# Patient Record
Sex: FEMALE | Race: WHITE | NOT HISPANIC OR LATINO
[De-identification: names, ages, dates, MRNs, and addresses within clinical notes are randomized per-mention and may not be internally consistent; named-entity substitution may affect disease eponyms.]

---

## 2020-12-16 ENCOUNTER — TRANSCRIPTION ENCOUNTER (OUTPATIENT)
Age: 55
End: 2020-12-16

## 2022-08-10 PROBLEM — Z00.00 ENCOUNTER FOR PREVENTIVE HEALTH EXAMINATION: Status: ACTIVE | Noted: 2022-08-10

## 2022-08-13 ENCOUNTER — EMERGENCY (EMERGENCY)
Facility: HOSPITAL | Age: 57
LOS: 0 days | Discharge: HOME | End: 2022-08-13
Attending: EMERGENCY MEDICINE
Payer: COMMERCIAL

## 2022-08-13 VITALS
OXYGEN SATURATION: 98 % | DIASTOLIC BLOOD PRESSURE: 68 MMHG | RESPIRATION RATE: 17 BRPM | HEART RATE: 75 BPM | SYSTOLIC BLOOD PRESSURE: 115 MMHG | TEMPERATURE: 98 F

## 2022-08-13 VITALS
HEART RATE: 60 BPM | SYSTOLIC BLOOD PRESSURE: 112 MMHG | RESPIRATION RATE: 16 BRPM | TEMPERATURE: 98 F | HEIGHT: 60 IN | OXYGEN SATURATION: 98 % | WEIGHT: 119.93 LBS | DIASTOLIC BLOOD PRESSURE: 80 MMHG

## 2022-08-13 DIAGNOSIS — Z20.822 CONTACT WITH AND (SUSPECTED) EXPOSURE TO COVID-19: ICD-10-CM

## 2022-08-13 DIAGNOSIS — R11.0 NAUSEA: ICD-10-CM

## 2022-08-13 DIAGNOSIS — R55 SYNCOPE AND COLLAPSE: ICD-10-CM

## 2022-08-13 DIAGNOSIS — R53.83 OTHER FATIGUE: ICD-10-CM

## 2022-08-13 DIAGNOSIS — M79.10 MYALGIA, UNSPECIFIED SITE: ICD-10-CM

## 2022-08-13 DIAGNOSIS — E78.5 HYPERLIPIDEMIA, UNSPECIFIED: ICD-10-CM

## 2022-08-13 DIAGNOSIS — R42 DIZZINESS AND GIDDINESS: ICD-10-CM

## 2022-08-13 LAB
ALBUMIN SERPL ELPH-MCNC: 3.6 G/DL — SIGNIFICANT CHANGE UP (ref 3.3–5)
ALP SERPL-CCNC: 91 U/L — SIGNIFICANT CHANGE UP (ref 40–120)
ALT FLD-CCNC: 27 U/L — SIGNIFICANT CHANGE UP (ref 12–78)
ANION GAP SERPL CALC-SCNC: 4 MMOL/L — LOW (ref 5–17)
APTT BLD: 26.7 SEC — LOW (ref 27.5–35.5)
AST SERPL-CCNC: 18 U/L — SIGNIFICANT CHANGE UP (ref 15–37)
BASOPHILS # BLD AUTO: 0.02 K/UL — SIGNIFICANT CHANGE UP (ref 0–0.2)
BASOPHILS NFR BLD AUTO: 0.4 % — SIGNIFICANT CHANGE UP (ref 0–2)
BILIRUB SERPL-MCNC: 0.4 MG/DL — SIGNIFICANT CHANGE UP (ref 0.2–1.2)
BUN SERPL-MCNC: 14 MG/DL — SIGNIFICANT CHANGE UP (ref 7–23)
CALCIUM SERPL-MCNC: 9 MG/DL — SIGNIFICANT CHANGE UP (ref 8.5–10.1)
CHLORIDE SERPL-SCNC: 112 MMOL/L — HIGH (ref 96–108)
CO2 SERPL-SCNC: 26 MMOL/L — SIGNIFICANT CHANGE UP (ref 22–31)
CREAT SERPL-MCNC: 0.71 MG/DL — SIGNIFICANT CHANGE UP (ref 0.5–1.3)
D DIMER BLD IA.RAPID-MCNC: <150 NG/ML DDU — SIGNIFICANT CHANGE UP
EGFR: 99 ML/MIN/1.73M2 — SIGNIFICANT CHANGE UP
EOSINOPHIL # BLD AUTO: 0.05 K/UL — SIGNIFICANT CHANGE UP (ref 0–0.5)
EOSINOPHIL NFR BLD AUTO: 0.9 % — SIGNIFICANT CHANGE UP (ref 0–6)
GLUCOSE SERPL-MCNC: 101 MG/DL — HIGH (ref 70–99)
HCG SERPL-ACNC: 3 MIU/ML — SIGNIFICANT CHANGE UP
HCT VFR BLD CALC: 36.6 % — SIGNIFICANT CHANGE UP (ref 34.5–45)
HGB BLD-MCNC: 12.9 G/DL — SIGNIFICANT CHANGE UP (ref 11.5–15.5)
IMM GRANULOCYTES NFR BLD AUTO: 0.2 % — SIGNIFICANT CHANGE UP (ref 0–1.5)
INR BLD: 1.02 RATIO — SIGNIFICANT CHANGE UP (ref 0.88–1.16)
LYMPHOCYTES # BLD AUTO: 1.42 K/UL — SIGNIFICANT CHANGE UP (ref 1–3.3)
LYMPHOCYTES # BLD AUTO: 25.1 % — SIGNIFICANT CHANGE UP (ref 13–44)
MCHC RBC-ENTMCNC: 32.3 PG — SIGNIFICANT CHANGE UP (ref 27–34)
MCHC RBC-ENTMCNC: 35.2 GM/DL — SIGNIFICANT CHANGE UP (ref 32–36)
MCV RBC AUTO: 91.5 FL — SIGNIFICANT CHANGE UP (ref 80–100)
MONOCYTES # BLD AUTO: 0.49 K/UL — SIGNIFICANT CHANGE UP (ref 0–0.9)
MONOCYTES NFR BLD AUTO: 8.7 % — SIGNIFICANT CHANGE UP (ref 2–14)
NEUTROPHILS # BLD AUTO: 3.66 K/UL — SIGNIFICANT CHANGE UP (ref 1.8–7.4)
NEUTROPHILS NFR BLD AUTO: 64.7 % — SIGNIFICANT CHANGE UP (ref 43–77)
PLATELET # BLD AUTO: 192 K/UL — SIGNIFICANT CHANGE UP (ref 150–400)
POTASSIUM SERPL-MCNC: 3.5 MMOL/L — SIGNIFICANT CHANGE UP (ref 3.5–5.3)
POTASSIUM SERPL-SCNC: 3.5 MMOL/L — SIGNIFICANT CHANGE UP (ref 3.5–5.3)
PROT SERPL-MCNC: 6.7 GM/DL — SIGNIFICANT CHANGE UP (ref 6–8.3)
PROTHROM AB SERPL-ACNC: 11.8 SEC — SIGNIFICANT CHANGE UP (ref 10.5–13.4)
RBC # BLD: 4 M/UL — SIGNIFICANT CHANGE UP (ref 3.8–5.2)
RBC # FLD: 12 % — SIGNIFICANT CHANGE UP (ref 10.3–14.5)
SODIUM SERPL-SCNC: 142 MMOL/L — SIGNIFICANT CHANGE UP (ref 135–145)
TROPONIN I, HIGH SENSITIVITY RESULT: <3 NG/L — SIGNIFICANT CHANGE UP
WBC # BLD: 5.65 K/UL — SIGNIFICANT CHANGE UP (ref 3.8–10.5)
WBC # FLD AUTO: 5.65 K/UL — SIGNIFICANT CHANGE UP (ref 3.8–10.5)

## 2022-08-13 PROCEDURE — 85610 PROTHROMBIN TIME: CPT

## 2022-08-13 PROCEDURE — 99285 EMERGENCY DEPT VISIT HI MDM: CPT

## 2022-08-13 PROCEDURE — 99285 EMERGENCY DEPT VISIT HI MDM: CPT | Mod: 25

## 2022-08-13 PROCEDURE — 84702 CHORIONIC GONADOTROPIN TEST: CPT

## 2022-08-13 PROCEDURE — 85379 FIBRIN DEGRADATION QUANT: CPT

## 2022-08-13 PROCEDURE — 93010 ELECTROCARDIOGRAM REPORT: CPT

## 2022-08-13 PROCEDURE — 80053 COMPREHEN METABOLIC PANEL: CPT

## 2022-08-13 PROCEDURE — 71045 X-RAY EXAM CHEST 1 VIEW: CPT | Mod: 26

## 2022-08-13 PROCEDURE — 84484 ASSAY OF TROPONIN QUANT: CPT

## 2022-08-13 PROCEDURE — 85730 THROMBOPLASTIN TIME PARTIAL: CPT

## 2022-08-13 PROCEDURE — U0005: CPT

## 2022-08-13 PROCEDURE — U0003: CPT

## 2022-08-13 PROCEDURE — 85025 COMPLETE CBC W/AUTO DIFF WBC: CPT

## 2022-08-13 PROCEDURE — 36415 COLL VENOUS BLD VENIPUNCTURE: CPT

## 2022-08-13 PROCEDURE — 93005 ELECTROCARDIOGRAM TRACING: CPT

## 2022-08-13 PROCEDURE — 71045 X-RAY EXAM CHEST 1 VIEW: CPT

## 2022-08-13 RX ORDER — SODIUM CHLORIDE 9 MG/ML
1000 INJECTION INTRAMUSCULAR; INTRAVENOUS; SUBCUTANEOUS ONCE
Refills: 0 | Status: COMPLETED | OUTPATIENT
Start: 2022-08-13 | End: 2022-08-13

## 2022-08-13 RX ADMIN — SODIUM CHLORIDE 1000 MILLILITER(S): 9 INJECTION INTRAMUSCULAR; INTRAVENOUS; SUBCUTANEOUS at 21:17

## 2022-08-13 NOTE — ED PROVIDER NOTE - NS ED ROS FT
Constitutional: No fever or chills  Eyes: No visual changes  HEENT: No throat pain  CV: No chest pain  Resp: No SOB no cough  GI: No abd pain, nausea or vomiting  : No dysuria  MSK: No musculoskeletal pain  Skin: No rash  Neuro: No headache Constitutional: + fatigue, No fever or chills  Eyes: No visual changes  HEENT: No throat pain  CV: No chest pain  Resp: No SOB no cough  GI: + nausea No abd pain, or vomiting  : No dysuria  MSK: No musculoskeletal pain  Skin: No rash  Neuro: + lightheadedness, No headache

## 2022-08-13 NOTE — ED PROVIDER NOTE - PROGRESS NOTE DETAILS
pt remains well. work up without acute findings. dc home. f/u with her cardiologist dr weaver on Monday. MD ARIAN

## 2022-08-13 NOTE — ED PROVIDER NOTE - OBJECTIVE STATEMENT
56 y/o female with a PMHx of HLD, Appendix surgery 4-5 years ago, 2 c-sections, elbow surgery presents to the ED s/p syncope. States she was at [facility] earing different foods. Pt c/o lightheadedness. Had loc for a few sec said ok, Had loc previous in similar situation 3 years ago. Pt c/o body pain , fatigue, abd discomfort, nausea. No +head strike.  states pt's eyes went blank and pt stated she was fine after syncope episode. Denies swelling or pain in calves. Patient reports she had no idea what was happening during episode. Medications: omeprazole, rosuvastatin. States 5-6 weeks ago she went to [facility] earlier. Today, earlier had a normal day. No drug use. States years ago had sharp chest pain and called EMS, went to [facility] with unknown cause. Cardiologist: Dr. Lemus 56 y/o female with a PMHx of HLD, Appendix surgery 4-5 years ago, 2 c-sections, elbow surgery presents to the ED s/p sudden syncope. States she was at a restaurant named [employer] earing different foods. Pt c/o lightheadedness. Had loc for a few sec said ok, Had loc previous in similar situation 3 years ago. Pt c/o body pain , fatigue, abd discomfort, nausea. No head strike.  states pt's eyes went blank and pt stated she was fine after syncope episode. Denies swelling or pain in calves. Patient reports she had no idea what was happening during episode. Medications: omeprazole, rosuvastatin. States 5-6 weeks ago she went to [facility] earlier. Today, earlier had a normal day. No drug use. States years ago had sharp chest pain and called EMS, went to [facility] with unknown cause. Cardiologist: Dr. Lemus

## 2022-08-13 NOTE — ED ADULT TRIAGE NOTE - NS ED TRIAGE AVPU SCALE
Alert-The patient is alert, awake and responds to voice. The patient is oriented to time, place, and person. The triage nurse is able to obtain subjective information. Breath sounds clear and equal bilaterally.

## 2022-08-13 NOTE — ED PROVIDER NOTE - PHYSICAL EXAMINATION
Constitutional: shivering, NAD AOx3  Eyes: PERRL EOMI  Head: Normocephalic atraumatic  Mouth: MMM  Cardiac: regular rate and rhythm  Resp: Lungs CTAB  GI: Abd s/nd/nt  Neuro: CN2-12 grossly intact, FANG x 4  Skin: No visible rashes

## 2022-08-13 NOTE — ED PROVIDER NOTE - PATIENT PORTAL LINK FT
You can access the FollowMyHealth Patient Portal offered by [facility] by registering at the following website: [email or web address]. By joining [facility]’s FollowMyHealth portal, you will also be able to view your health information using other applications (apps) compatible with our system.

## 2022-08-13 NOTE — ED ADULT NURSE NOTE - OBJECTIVE STATEMENT
Pt is A&O x 3,  at bedside, Pt states she was at restaurant and was eating different foods when she passed out, 1 drink of wine, passed out quickly then woke back up, V/S/S, tele monitor and cont. pulse ox. in place, BP monitoring every 15 min and stable, Pt resting with  at bedside, two IVS in bilateral arms, IV fluids administered. PT states having generalized pain after syncopal episode.

## 2022-08-13 NOTE — ED ADULT NURSE NOTE - CHIEF COMPLAINT QUOTE
[facility] states she started to feel dizzy and then passed out onto the table, she was seated in a chair and did not fall. she has been diagnosed with vasovagal syndrome by her cardiologist. Patient originally with low BP and low HR with EMS now normotensive and HR WNL. patient states she only had one glass of wine this evening, states she probably did not drink enough water

## 2022-08-13 NOTE — ED ADULT TRIAGE NOTE - CHIEF COMPLAINT QUOTE
[facility] states she started to feel dizzy and then passed out onto the table, she was seated in a chair and did not fall. she has been diagnosed with vasovagal syndrome by her cardiologist. Patient originally with low BP and low HR with EMS now normotensive and HR WNL. patient states she only had one glass of wine this evening, states she probably did not drink enough water

## 2022-08-14 LAB — SARS-COV-2 RNA SPEC QL NAA+PROBE: SIGNIFICANT CHANGE UP

## 2022-08-25 ENCOUNTER — APPOINTMENT (OUTPATIENT)
Dept: ORTHOPEDIC SURGERY | Facility: CLINIC | Age: 57
End: 2022-08-25

## 2022-08-25 VITALS — BODY MASS INDEX: 23.56 KG/M2 | HEIGHT: 60 IN | WEIGHT: 120 LBS

## 2022-08-25 DIAGNOSIS — E78.00 PURE HYPERCHOLESTEROLEMIA, UNSPECIFIED: ICD-10-CM

## 2022-08-25 DIAGNOSIS — S80.12XA CONTUSION OF LEFT LOWER LEG, INITIAL ENCOUNTER: ICD-10-CM

## 2022-08-25 DIAGNOSIS — Z87.19 PERSONAL HISTORY OF OTHER DISEASES OF THE DIGESTIVE SYSTEM: ICD-10-CM

## 2022-08-25 PROCEDURE — 99213 OFFICE O/P EST LOW 20 MIN: CPT

## 2022-08-25 PROCEDURE — 73590 X-RAY EXAM OF LOWER LEG: CPT | Mod: LT

## 2022-08-25 RX ORDER — ROSUVASTATIN CALCIUM 5 MG/1
TABLET, FILM COATED ORAL
Refills: 0 | Status: ACTIVE | COMMUNITY

## 2022-08-25 RX ORDER — OMEPRAZOLE 20 MG/1
TABLET, ORALLY DISINTEGRATING, DELAYED RELEASE ORAL
Refills: 0 | Status: ACTIVE | COMMUNITY

## 2022-08-25 NOTE — IMAGING
[Left] : left tibia/fibula [There are no fractures, subluxations or dislocations. No significant abnormalities are seen] : There are no fractures, subluxations or dislocations. No significant abnormalities are seen [de-identified] : LEFT ANKLE\par 1. No swelling\par 2. TTP DISTAL MEDIAL TIBIA, NO TTP MEDIAL MALL OR MEDIAL LIGAMENTS\par 3. Dorsiflexion 20, plantar flexion 40, inversion 30, eversion 20\par 4. 5/5 plantar flexion, dorsiflexion, eversion, inversion\par 5. Negative drawer, no pain with single heel raise.\par 6. Motor and sensory intact distally\par 7. +2 DP and PT pulses\par 8. Negative abigail\par 9. Non-antalgic gait\par
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
## 2022-09-09 ENCOUNTER — TRANSCRIPTION ENCOUNTER (OUTPATIENT)
Age: 57
End: 2022-09-09

## 2022-09-30 ENCOUNTER — APPOINTMENT (OUTPATIENT)
Dept: ORTHOPEDIC SURGERY | Facility: CLINIC | Age: 57
End: 2022-09-30

## 2022-09-30 DIAGNOSIS — M67.449 GANGLION, UNSPECIFIED HAND: ICD-10-CM

## 2022-09-30 PROCEDURE — 73140 X-RAY EXAM OF FINGER(S): CPT | Mod: LT

## 2022-09-30 PROCEDURE — 99213 OFFICE O/P EST LOW 20 MIN: CPT

## 2022-09-30 NOTE — ASSESSMENT
[FreeTextEntry1] : The condition was explained to the patient.\par Discussed risks and benefits of treatment options for ganglion cyst - observation, NSAID, brace, aspiration, or surgery. Patient would like to continue observation at this time.\par - recommend padded glove for gripping activities.\par \par F/u PRN.

## 2022-09-30 NOTE — HISTORY OF PRESENT ILLNESS
[1] : 2 [de-identified] : 9/30/22: 56yo RHD F () presents for LEFT middle finger bump that has been increasing in size for a few weeks. Denies injury. Denies pain.\par \par Hx: GERD. high cholesterol. [] : no [FreeTextEntry1] : left middle finger [FreeTextEntry5] : NP Lizette Cabrales 57 yr old f present today c/o a lump she noticed in the bottom of the middle finger for the past few weeks. states its not painful but the lump is getting bigger does not recall any injury. states she has a hx of calcium deposits in her shoulder doesn’t know if its related

## 2022-09-30 NOTE — IMAGING
[de-identified] : LEFT HAND\par small sub-centimeter ovoid mass over MF volar ulnar proximal phalanx, mobile, non-TTP.\par skin intact. no swelling.\par no TTP.\par good EPL, FPL. good finger extension, flex to full fist. good finger abduction, adduction.\par SILT to median, ulnar, radial distribution. \par palpable radial pulse, brisk cap refill all digits.\par no triggering. [Left] : left fingers [FreeTextEntry9] : middle: no acute displaced fracture or dislocation.

## 2024-02-25 NOTE — REASON FOR VISIT
[employer] Alert Outreach Telephone Call Attempt     Patient is being outreached by the [facility] for a clinical alert from the [employer] monitoring program.     Call Attempt Date: 2/25/2024    Call Attempt: First Attempt  
[FreeTextEntry2] : NP left middle finger

## 2024-03-26 ENCOUNTER — NON-APPOINTMENT (OUTPATIENT)
Age: 59
End: 2024-03-26

## 2024-03-27 ENCOUNTER — APPOINTMENT (OUTPATIENT)
Dept: ORTHOPEDIC SURGERY | Facility: CLINIC | Age: 59
End: 2024-03-27
Payer: COMMERCIAL

## 2024-03-27 VITALS — HEIGHT: 60 IN | WEIGHT: 123 LBS | BODY MASS INDEX: 24.15 KG/M2

## 2024-03-27 DIAGNOSIS — M17.11 UNILATERAL PRIMARY OSTEOARTHRITIS, RIGHT KNEE: ICD-10-CM

## 2024-03-27 PROCEDURE — 99203 OFFICE O/P NEW LOW 30 MIN: CPT | Mod: 25

## 2024-03-27 PROCEDURE — 73564 X-RAY EXAM KNEE 4 OR MORE: CPT | Mod: RT

## 2024-03-27 NOTE — HISTORY OF PRESENT ILLNESS
[de-identified] : 03/27/2024 Ms. KIMBERLY WHITE, a 58 year old female (, Pilates, tennis, pickleball), presents today for right knee pain since 3/18/24 after doing Pilates. Pain is deep, medial, posterior and associated with buckling. Worse with stairs, leaning over, SLS. Better rest, ice, meloxicam.

## 2024-03-27 NOTE — DISCUSSION/SUMMARY
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 Dr. Carroll- The documentation recorded by the scribe accurately reflects the service I personally performed and the decisions made by me.

## 2024-03-27 NOTE — PHYSICAL EXAM
[Right] : right knee [NL (140)] : flexion 140 degrees [NL (0)] : extension 0 degrees [5___] : hamstring 5[unfilled]/5 [Negative] : negative Marianna's [] : patient ambulates without assistive device

## 2024-04-17 ENCOUNTER — APPOINTMENT (OUTPATIENT)
Dept: ORTHOPEDIC SURGERY | Facility: CLINIC | Age: 59
End: 2024-04-17

## 2024-05-22 ENCOUNTER — RX RENEWAL (OUTPATIENT)
Age: 59
End: 2024-05-22

## 2024-05-22 RX ORDER — MELOXICAM 15 MG/1
15 TABLET ORAL
Qty: 30 | Refills: 1 | Status: ACTIVE | COMMUNITY
Start: 2024-03-27 | End: 1900-01-01

## 2024-07-16 ENCOUNTER — RX RENEWAL (OUTPATIENT)
Age: 59
End: 2024-07-16

## 2025-02-27 ENCOUNTER — RX RENEWAL (OUTPATIENT)
Age: 60
End: 2025-02-27

## 2025-05-09 ENCOUNTER — APPOINTMENT (OUTPATIENT)
Dept: ORTHOPEDIC SURGERY | Facility: CLINIC | Age: 60
End: 2025-05-09
Payer: COMMERCIAL

## 2025-05-09 VITALS — BODY MASS INDEX: 24.15 KG/M2 | WEIGHT: 123 LBS | HEIGHT: 60 IN

## 2025-05-09 DIAGNOSIS — M25.611 STIFFNESS OF RIGHT SHOULDER, NOT ELSEWHERE CLASSIFIED: ICD-10-CM

## 2025-05-09 DIAGNOSIS — M25.511 PAIN IN RIGHT SHOULDER: ICD-10-CM

## 2025-05-09 DIAGNOSIS — M75.32 CALCIFIC TENDINITIS OF LEFT SHOULDER: ICD-10-CM

## 2025-05-09 DIAGNOSIS — M75.31 CALCIFIC TENDINITIS OF RIGHT SHOULDER: ICD-10-CM

## 2025-05-09 PROCEDURE — 99213 OFFICE O/P EST LOW 20 MIN: CPT | Mod: 25

## 2025-05-09 PROCEDURE — 73010 X-RAY EXAM OF SHOULDER BLADE: CPT | Mod: RT

## 2025-05-09 PROCEDURE — 20611 DRAIN/INJ JOINT/BURSA W/US: CPT | Mod: RT

## 2025-05-09 PROCEDURE — 73030 X-RAY EXAM OF SHOULDER: CPT | Mod: RT

## 2025-06-06 ENCOUNTER — APPOINTMENT (OUTPATIENT)
Dept: ORTHOPEDIC SURGERY | Facility: CLINIC | Age: 60
End: 2025-06-06
Payer: COMMERCIAL

## 2025-06-06 ENCOUNTER — NON-APPOINTMENT (OUTPATIENT)
Age: 60
End: 2025-06-06

## 2025-06-06 VITALS — WEIGHT: 123 LBS | BODY MASS INDEX: 24.15 KG/M2 | HEIGHT: 60 IN

## 2025-06-06 PROCEDURE — 99214 OFFICE O/P EST MOD 30 MIN: CPT

## 2025-06-06 PROCEDURE — 73030 X-RAY EXAM OF SHOULDER: CPT | Mod: LT

## 2025-06-06 PROCEDURE — 73010 X-RAY EXAM OF SHOULDER BLADE: CPT | Mod: LT

## 2025-08-08 ENCOUNTER — APPOINTMENT (OUTPATIENT)
Dept: ORTHOPEDIC SURGERY | Facility: CLINIC | Age: 60
End: 2025-08-08
Payer: COMMERCIAL

## 2025-08-08 DIAGNOSIS — M75.32 CALCIFIC TENDINITIS OF LEFT SHOULDER: ICD-10-CM

## 2025-08-08 DIAGNOSIS — M75.31 CALCIFIC TENDINITIS OF RIGHT SHOULDER: ICD-10-CM

## 2025-08-08 PROCEDURE — 99214 OFFICE O/P EST MOD 30 MIN: CPT

## 2025-09-19 ENCOUNTER — APPOINTMENT (OUTPATIENT)
Dept: ORTHOPEDIC SURGERY | Facility: CLINIC | Age: 60
End: 2025-09-19